# Patient Record
(demographics unavailable — no encounter records)

---

## 2025-07-30 NOTE — PHYSICAL EXAM
[General Appearance - In No Acute Distress] : no acute distress [Heart Rate And Rhythm] : heart rate and rhythm were normal [] : no respiratory distress [Abdomen Soft] : soft [Normal Station and Gait] : the gait and station were normal for the patient's age [Skin Color & Pigmentation] : normal skin color and pigmentation [No Focal Deficits] : no focal deficits [de-identified] : pt deferred exam

## 2025-07-30 NOTE — ASSESSMENT
[FreeTextEntry1] : PVR(LUTs ) :12ml    After a discussion of his urologic history and exam, and a review the urological issues,   1.BPH/LUTS -  s/p PAE 2020. UA/UC/Cytology sent will f/u with results. Continue flomax. Continue CIC PRN   2. Erectile Dysfunction - deferred SALVATORE-deferred treatment   3. Prostate Ca- previous PSA to be fax over. Followed by Oncologist. Last cyberknife (6/2025) Will repeat PSA 9/2025  The risks and benefits of the medication(s) and/or treatment plan including various surgical therapies were discussed in detail with the patient who understands and wishes to proceed with plan. Inclusive of discussion were the impact of various therapies on sexual function, incontinence and other relevant quality of life issues. More than 50% was spent on counseling/coordination the care of the patient, regarding treatment options/surgical management.

## 2025-07-30 NOTE — HISTORY OF PRESENT ILLNESS
[Urinary Retention] : urinary retention [Urinary Urgency] : urinary urgency [Urinary Frequency] : urinary frequency [Nocturia] : nocturia [Weak Stream] : weak stream [FreeTextEntry1] : CC: BPH/LUTs   JAMES OVERTON is a 73 year male hx of PAE (2020) and prostate cancer (cyberknife last treatment 6/20/25) who presents to the office with BPH /LUTs for many years. MR CONTRERAS is currently CIC PRN. He is currently on flomax daily. Recently had last cyberknife treatment a month ago. Denies any fevers, chills, dysuria, straining IPSS 22/6 SALVATORE deferred                                                [Urinary Incontinence] : no urinary incontinence [Straining] : no straining [Dysuria] : no dysuria [Hematuria - Gross] : no gross hematuria